# Patient Record
Sex: FEMALE | Race: WHITE | NOT HISPANIC OR LATINO | Employment: STUDENT | URBAN - METROPOLITAN AREA
[De-identification: names, ages, dates, MRNs, and addresses within clinical notes are randomized per-mention and may not be internally consistent; named-entity substitution may affect disease eponyms.]

---

## 2024-01-15 ENCOUNTER — TRANSFERRED RECORDS (OUTPATIENT)
Dept: HEALTH INFORMATION MANAGEMENT | Facility: CLINIC | Age: 19
End: 2024-01-15

## 2024-03-29 ENCOUNTER — TRANSFERRED RECORDS (OUTPATIENT)
Dept: HEALTH INFORMATION MANAGEMENT | Facility: CLINIC | Age: 19
End: 2024-03-29

## 2024-08-12 ENCOUNTER — MEDICAL CORRESPONDENCE (OUTPATIENT)
Dept: HEALTH INFORMATION MANAGEMENT | Facility: CLINIC | Age: 19
End: 2024-08-12

## 2024-08-12 ENCOUNTER — TRANSFERRED RECORDS (OUTPATIENT)
Dept: HEALTH INFORMATION MANAGEMENT | Facility: CLINIC | Age: 19
End: 2024-08-12

## 2024-08-20 ENCOUNTER — TRANSFERRED RECORDS (OUTPATIENT)
Dept: HEALTH INFORMATION MANAGEMENT | Facility: CLINIC | Age: 19
End: 2024-08-20
Payer: COMMERCIAL

## 2024-09-11 ENCOUNTER — OFFICE VISIT (OUTPATIENT)
Dept: CARDIOLOGY | Facility: CLINIC | Age: 19
End: 2024-09-11
Payer: COMMERCIAL

## 2024-09-11 VITALS
BODY MASS INDEX: 21.33 KG/M2 | HEIGHT: 65 IN | DIASTOLIC BLOOD PRESSURE: 50 MMHG | SYSTOLIC BLOOD PRESSURE: 90 MMHG | HEART RATE: 62 BPM | WEIGHT: 128 LBS | RESPIRATION RATE: 17 BRPM

## 2024-09-11 DIAGNOSIS — I95.0 IDIOPATHIC HYPOTENSION: Primary | ICD-10-CM

## 2024-09-11 PROCEDURE — 99203 OFFICE O/P NEW LOW 30 MIN: CPT | Performed by: STUDENT IN AN ORGANIZED HEALTH CARE EDUCATION/TRAINING PROGRAM

## 2024-09-11 PROCEDURE — G2211 COMPLEX E/M VISIT ADD ON: HCPCS | Performed by: STUDENT IN AN ORGANIZED HEALTH CARE EDUCATION/TRAINING PROGRAM

## 2024-09-11 NOTE — PATIENT INSTRUCTIONS
It was a pleasure meeting you today    In summary:    We will get an echocardiogram to assess the heart function and structure.  I would recommend we increase the salt intake further which will help boost the blood volume and help with the blood pressure.    Please call my nurse Claus Lilly at [phone] if you have any questions or issues.    We will schedule a follow up visit in  4 months      Black Moulton DO [facility]  Non-invasive Cardiologist  [facility]

## 2024-09-11 NOTE — PROGRESS NOTES
"  [facility]   [address]  [place] [address]   [email or web address]   OFFICE: [phone]     CARDIOLOGY CLINIC NOTE     Thank you, Keyona Montano, for asking the [facility] team to see Ms. Lizeth Segovia to evaluate Consult          History of Present Illness   Ms. Lizeth Segovia is a 18 year old female with a significant past history of low blood pressure who presents for evaluation of her blood pressure.  The patient notes her BP has always been on the low side but since about 2021 or 2022 BP has been very low.  She runs track and field and notes lightheadedness while working out (more weight lifting rather than running) and sometimes after working out.  She recently started adding salt to her water and drinking electrolyte solutions which has helped.  She denies any syncope.  She does note she got COVID about 4 times and wonders if she could have a manifestation of long COVID.  She also notes following a strict diet of no dairy, sugar, gluten.  She does not restrict salt but thinks she may not get enough dietary salt.  She does note her sister has POTS diagnosed at [place].  It sounds like she has had issues since she was 10 and has more classic POTS symptoms.           Medications  Allergies   No current outpatient medications on file.      Allergies   Allergen Reactions    Cats Other (See Comments)     Sneezing    Lactose GI Disturbance    Other Environmental Allergy Other (See Comments)     Sneezing        Physical Examination Review of Systems   Vitals: BP 90/50 (BP Location: Left arm, Patient Position: Sitting, Cuff Size: Adult Regular)   Pulse 62   Resp 17   Ht 1.64 m (5' 4.57\")   Wt 58.1 kg (128 lb)   BMI 21.59 kg/m    BMI= Body mass index is 21.59 kg/m .  Wt Readings from Last 3 Encounters:   09/11/24 58.1 kg (128 lb)       General: pleasant female. No acute distress.   Neck: No JVD  Lungs: clear to auscultation  COR:  " "regular rate and rhythm, No murmurs, rubs, or gallops  Extrem: No edema        Please refer above for cardiac ROS details.       Past History     Family History: No family history on file.     Social History:   Social History     Socioeconomic History    Marital status: Single     Spouse name: Not on file    Number of children: Not on file    Years of education: Not on file    Highest education level: Not on file   Occupational History    Not on file   Tobacco Use    Smoking status: Never    Smokeless tobacco: Never   Substance and Sexual Activity    Alcohol use: Not on file    Drug use: Never    Sexual activity: Not on file   Other Topics Concern    Not on file   Social History Narrative    Not on file     Social Determinants of Health     Financial Resource Strain: Not on file   Food Insecurity: Not on file   Transportation Needs: Not on file   Physical Activity: Not on file   Stress: Not on file   Social Connections: Not on file   Interpersonal Safety: Not on file   Housing Stability: Not on file            Lab Results    Chemistry/lipid CBC Cardiac Enzymes/BNP/TSH/INR   No results found for: \"CHOL\", \"HDL\", \"TRIG\", \"CHOLHDL\", \"CREATININE\", \"BUN\", \"NA\", \"CO2\" No results found for: \"WBC\", \"HGB\", \"HCT\", \"MCV\", \"PLT\" No results found for: \"CKTOTAL\", \"CKMB\", \"TROPONINI\", \"BNP\", \"TSH\", \"INR\"       Cardiac Problems and Cardiac Diagnostics     Most Recent Cardiac testing:  Ziopatch 7 day 8/2024 results reviewed  Avg HR 72 (range )  No significant ectopy or arrhythmia    24 hr BP monitor 8/2024 results reviewed  Average BP 93/55  Minimal nocturnal change         Assessment/Recommendations   Assessment:    Ms. Lizeth Segovia is a 18 year old female with a significant past history of low blood pressure who presents for evaluation of her blood pressure.     Idiopathic hypotension   - Lightheadedness during exercise correlates with BP of 77/47 on 24 hr BP monitor   - Encouraged to aggressively increase electrolyte " and salt intake, especially before and during exercise.   - Etiology is unclear but dietary restrictions and low salt intake may be contributing   - Another possibility is autonomic dysfunction related to COVID.     - Will get a TTE to rule out structural/functional problems    RTC 4 months    The longitudinal plan of care for the diagnosis(es)/condition(s) as documented were addressed during this visit. Due to the added complexity in care, I will continue to support Lizeth in the subsequent management and with ongoing continuity of care.         Black Moulton DO [facility]  Non-invasive Cardiologist  [facility]

## 2024-09-11 NOTE — LETTER
"9/11/2024    ROCCO SOARES MD  [facility] [address] [place] [id number]    RE: Lizeth Segovia       Dear Colleague,     I had the pleasure of seeing Lizeth Segovia in the [facility].    [facility]   [address]  [place] [address]   [email or web address]   OFFICE: [phone]     CARDIOLOGY CLINIC NOTE     Thank you, Rocco Montano, for asking the [facility] team to see Ms. Lizeth Segovia to evaluate Consult          History of Present Illness   Ms. Lizeth Segovia is a 18 year old female with a significant past history of low blood pressure who presents for evaluation of her blood pressure.  The patient notes her BP has always been on the low side but since about 2021 or 2022 BP has been very low.  She runs track and field and notes lightheadedness while working out (more weight lifting rather than running) and sometimes after working out.  She recently started adding salt to her water and drinking electrolyte solutions which has helped.  She denies any syncope.  She does note she got COVID about 4 times and wonders if she could have a manifestation of long COVID.  She also notes following a strict diet of no dairy, sugar, gluten.  She does not restrict salt but thinks she may not get enough dietary salt.  She does note her sister has POTS diagnosed at [place].  It sounds like she has had issues since she was 10 and has more classic POTS symptoms.           Medications  Allergies   No current outpatient medications on file.      Allergies   Allergen Reactions     Cats Other (See Comments)     Sneezing     Lactose GI Disturbance     Other Environmental Allergy Other (See Comments)     Sneezing        Physical Examination Review of Systems   Vitals: BP 90/50 (BP Location: Left arm, Patient Position: Sitting, Cuff Size: Adult Regular)   Pulse 62   Resp 17   Ht 1.64 m (5' 4.57\")   Wt 58.1 kg (128 lb)  " " BMI 21.59 kg/m    BMI= Body mass index is 21.59 kg/m .  Wt Readings from Last 3 Encounters:   09/11/24 58.1 kg (128 lb)       General: pleasant female. No acute distress.   Neck: No JVD  Lungs: clear to auscultation  COR:  regular rate and rhythm, No murmurs, rubs, or gallops  Extrem: No edema        Please refer above for cardiac ROS details.       Past History     Family History: No family history on file.     Social History:   Social History     Socioeconomic History     Marital status: Single     Spouse name: Not on file     Number of children: Not on file     Years of education: Not on file     Highest education level: Not on file   Occupational History     Not on file   Tobacco Use     Smoking status: Never     Smokeless tobacco: Never   Substance and Sexual Activity     Alcohol use: Not on file     Drug use: Never     Sexual activity: Not on file   Other Topics Concern     Not on file   Social History Narrative     Not on file     Social Determinants of Health     Financial Resource Strain: Not on file   Food Insecurity: Not on file   Transportation Needs: Not on file   Physical Activity: Not on file   Stress: Not on file   Social Connections: Not on file   Interpersonal Safety: Not on file   Housing Stability: Not on file            Lab Results    Chemistry/lipid CBC Cardiac Enzymes/BNP/TSH/INR   No results found for: \"CHOL\", \"HDL\", \"TRIG\", \"CHOLHDL\", \"CREATININE\", \"BUN\", \"NA\", \"CO2\" No results found for: \"WBC\", \"HGB\", \"HCT\", \"MCV\", \"PLT\" No results found for: \"CKTOTAL\", \"CKMB\", \"TROPONINI\", \"BNP\", \"TSH\", \"INR\"       Cardiac Problems and Cardiac Diagnostics     Most Recent Cardiac testing:  Ziopatch 7 day 8/2024 results reviewed  Avg HR 72 (range )  No significant ectopy or arrhythmia    24 hr BP monitor 8/2024 results reviewed  Average BP 93/55  Minimal nocturnal change         Assessment/Recommendations   Assessment:    Ms. Lizeth Segovia is a 18 year old female with a significant past history " of low blood pressure who presents for evaluation of her blood pressure.     Idiopathic hypotension   - Lightheadedness during exercise correlates with BP of 77/47 on 24 hr BP monitor   - Encouraged to aggressively increase electrolyte and salt intake, especially before and during exercise.   - Etiology is unclear but dietary restrictions and low salt intake may be contributing   - Another possibility is autonomic dysfunction related to COVID.     - Will get a TTE to rule out structural/functional problems    RTC 4 months    The longitudinal plan of care for the diagnosis(es)/condition(s) as documented were addressed during this visit. Due to the added complexity in care, I will continue to support Lizeth in the subsequent management and with ongoing continuity of care.         Black Moulton DO [facility]  Non-invasive Cardiologist  [facility]       Thank you for allowing me to participate in the care of your patient.      Sincerely,     Black Moulton DO     [facility]  cc:   Keyona Bowen MD  [facility]  [address] [place], [place] [id number]

## 2024-10-04 ENCOUNTER — ANCILLARY PROCEDURE (OUTPATIENT)
Dept: CARDIOLOGY | Facility: CLINIC | Age: 19
End: 2024-10-04
Attending: STUDENT IN AN ORGANIZED HEALTH CARE EDUCATION/TRAINING PROGRAM
Payer: COMMERCIAL

## 2024-10-04 DIAGNOSIS — I95.0 IDIOPATHIC HYPOTENSION: ICD-10-CM

## 2024-10-04 PROCEDURE — 93306 TTE W/DOPPLER COMPLETE: CPT | Performed by: INTERNAL MEDICINE

## 2024-11-16 ENCOUNTER — HEALTH MAINTENANCE LETTER (OUTPATIENT)
Age: 19
End: 2024-11-16

## 2025-02-18 ENCOUNTER — OFFICE VISIT (OUTPATIENT)
Dept: FAMILY MEDICINE | Facility: CLINIC | Age: 20
End: 2025-02-18
Payer: COMMERCIAL

## 2025-02-18 ENCOUNTER — ANCILLARY PROCEDURE (OUTPATIENT)
Dept: GENERAL RADIOLOGY | Facility: CLINIC | Age: 20
End: 2025-02-18
Payer: COMMERCIAL

## 2025-02-18 VITALS
HEART RATE: 75 BPM | WEIGHT: 130 LBS | OXYGEN SATURATION: 99 % | DIASTOLIC BLOOD PRESSURE: 50 MMHG | SYSTOLIC BLOOD PRESSURE: 102 MMHG | TEMPERATURE: 97.4 F | BODY MASS INDEX: 21.66 KG/M2 | HEIGHT: 65 IN | RESPIRATION RATE: 16 BRPM

## 2025-02-18 DIAGNOSIS — R07.81 RIB PAIN: Primary | ICD-10-CM

## 2025-02-18 DIAGNOSIS — R07.81 RIB PAIN: ICD-10-CM

## 2025-02-18 DIAGNOSIS — Z83.1: ICD-10-CM

## 2025-02-18 PROCEDURE — 36415 COLL VENOUS BLD VENIPUNCTURE: CPT

## 2025-02-18 PROCEDURE — 99213 OFFICE O/P EST LOW 20 MIN: CPT

## 2025-02-18 PROCEDURE — 86612 BLASTOMYCES ANTIBODY: CPT | Mod: 90

## 2025-02-18 PROCEDURE — 86622 BRUCELLA ANTIBODY: CPT | Mod: 90

## 2025-02-18 PROCEDURE — 71046 X-RAY EXAM CHEST 2 VIEWS: CPT | Mod: TC | Performed by: RADIOLOGY

## 2025-02-18 PROCEDURE — 99000 SPECIMEN HANDLING OFFICE-LAB: CPT

## 2025-02-18 NOTE — PROGRESS NOTES
Assessment & Plan     Rib pain  Significant recent illness.  Pain is elicited with inspiration and has been going on for a few weeks.  Has not previously had the symptoms, no cough. Chest xray to rule out pulmonary cause, pleurisy, pneumonia, mom recently tested positive for blastomycosis.  Lung sounds clear on exam.  Suspect costochondritis, discussed prednisone and patient declines but may notify clinic if she changes her mind.  Given printed information on costochondritis.  - XR Chest 2 Views; Future    Family history of brucellosis  Mother has recently been diagnosed with positive Brucella and Blastomyces infection.  They do have a family history of raw milk consumption in approximately 2018.  She is working with [facility].  Her symptoms included fatigue, some chest pain and shortness of breath, GI symptoms which daughter is also experiencing.  Will test today and refer to ID at [facility] if positive.   - Brucella species antibody; Future  - Brucella species antibody    Family history of blastomycosis  See above.  Patient denies cough, lung sounds clear on exam  - Blastomyces antibody ID; Future  - Blastomyces antibody ID              If not gettign resolutin of rib pain ricardo call for prednisone.   Denise Stanley is a 19 year old, presenting for the following health issues:  Breathing Problem (Right side chest pain while breathing x few weeks. Has hx of this. No cough. Also having GI issues. )      2/18/2025    11:16 AM   Additional Questions   Roomed by nesha lerma   Accompanied by self     History of Present Illness       Reason for visit:  Chest pain while breathing  Symptom onset:  3-4 weeks ago  Symptoms include:  Chest pain right side while breathing  Symptom intensity:  Moderate  Symptom progression:  Worsening  Had these symptoms before:  Yes  Has tried/received treatment for these symptoms:  No  What makes it worse:  Breathing deeply and jumping or running  What makes it better:  Breathing  "shallowly and staying still   She is taking medications regularly.                     Objective    /50 (BP Location: Right arm, Patient Position: Sitting)   Pulse 75   Temp 97.4  F (36.3  C) (Tympanic)   Resp 16   Ht 1.638 m (5' 4.5\")   Wt 59 kg (130 lb)   LMP 02/11/2025 (Exact Date)   SpO2 99%   BMI 21.97 kg/m    Body mass index is 21.97 kg/m .  Physical Exam   GENERAL: alert and no distress  EYES: Eyes grossly normal to inspection, PERRL and conjunctivae and sclerae normal  HENT: ear canals and TM's normal, nose and mouth without ulcers or lesions  NECK: no adenopathy, no asymmetry, masses, or scars  RESP: lungs clear to auscultation - no rales, rhonchi or wheezes  CV: regular rate and rhythm, normal S1 S2, no S3 or S4, no murmur, click or rub, no peripheral edema  ABDOMEN: soft, nontender, no hepatosplenomegaly, no masses and bowel sounds normal  MS: no gross musculoskeletal defects noted, no edema  SKIN: no suspicious lesions or rashes  NEURO: Normal strength and tone, mentation intact and speech normal  PSYCH: mentation appears normal, affect normal/bright            Signed Electronically by: MARIELENA Mukherjee CNP    "

## 2025-02-18 NOTE — LETTER
February 18, 2025      Lizeth Segovia  [address] [id number]        To Whom It May Concern:    Lizeth Segovia was seen on 2/18/2025.  Please allow her to modify or decrease activity due to illness.      Sincerely,        MARIELENA Mukherjee CNP    Electronically signed

## 2025-02-22 LAB — BRUCELLA AB TITR SER AGGL: NORMAL {TITER}

## 2025-02-23 LAB — B DERMAT AB SER QL ID: NOT DETECTED

## 2025-03-08 ENCOUNTER — TRANSFERRED RECORDS (OUTPATIENT)
Dept: HEALTH INFORMATION MANAGEMENT | Facility: CLINIC | Age: 20
End: 2025-03-08
Payer: COMMERCIAL

## 2025-03-08 LAB
ALT SERPL-CCNC: 18 U/L
AST SERPL-CCNC: 30 U/L (ref 14–36)
CREATININE (EXTERNAL): 1.1 MG/DL (ref 0.7–1.4)
GLUCOSE (EXTERNAL): 85 MG/DL (ref 60–99)
POTASSIUM (EXTERNAL): 4.1 MMOL/L (ref 3.5–5.1)
TSH SERPL-ACNC: 0.53 MIU/L (ref 0.47–4.68)

## 2025-04-21 ENCOUNTER — OFFICE VISIT (OUTPATIENT)
Dept: CARDIOLOGY | Facility: CLINIC | Age: 20
End: 2025-04-21
Attending: STUDENT IN AN ORGANIZED HEALTH CARE EDUCATION/TRAINING PROGRAM
Payer: COMMERCIAL

## 2025-04-21 VITALS
WEIGHT: 128 LBS | SYSTOLIC BLOOD PRESSURE: 99 MMHG | OXYGEN SATURATION: 99 % | HEART RATE: 60 BPM | DIASTOLIC BLOOD PRESSURE: 63 MMHG | BODY MASS INDEX: 21.63 KG/M2 | RESPIRATION RATE: 16 BRPM

## 2025-04-21 DIAGNOSIS — U07.1 INFECTION DUE TO 2019 NOVEL CORONAVIRUS: ICD-10-CM

## 2025-04-21 DIAGNOSIS — I95.0 IDIOPATHIC HYPOTENSION: Primary | ICD-10-CM

## 2025-04-21 PROCEDURE — 3078F DIAST BP <80 MM HG: CPT | Performed by: STUDENT IN AN ORGANIZED HEALTH CARE EDUCATION/TRAINING PROGRAM

## 2025-04-21 PROCEDURE — 3074F SYST BP LT 130 MM HG: CPT | Performed by: STUDENT IN AN ORGANIZED HEALTH CARE EDUCATION/TRAINING PROGRAM

## 2025-04-21 PROCEDURE — 99213 OFFICE O/P EST LOW 20 MIN: CPT | Performed by: STUDENT IN AN ORGANIZED HEALTH CARE EDUCATION/TRAINING PROGRAM

## 2025-04-21 RX ORDER — MIDODRINE HYDROCHLORIDE 2.5 MG/1
2.5 TABLET ORAL EVERY 8 HOURS PRN
Qty: 30 TABLET | Refills: 1 | Status: SHIPPED | OUTPATIENT
Start: 2025-04-21

## 2025-04-21 NOTE — LETTER
4/21/2025    ROCCO SOARES MD  [facility] [address] [place] [id number]    RE: Lizeth Segovia       Dear Colleague,     I had the pleasure of seeing Lizeth Segovia in the [facility].    [facility]   [address]  [place] [address]   [email or web address]   OFFICE: [phone]     CARDIOLOGY CLINIC NOTE     Thank you, Rocco Montano, for asking the [facility] team to see Ms. Lizeth Segovia to evaluate Follow Up          History of Present Illness   Ms. Lizeth Segovia is a 19 year old female with a significant past history of low blood pressure, prior COVID infection, who presents for follow up.  The patient notes she has done well overall.  She still has issues with lightheadedness on position changes and one time during an indoor track meet when it was very hot.  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  She still has not passed out but gets spotty vision, muffled hearing, and lightheadedness.             Medications  Allergies   Current Outpatient Medications   Medication Sig Dispense Refill     Ascorbic Acid (VITAMIN C) 500 MG CAPS Take by mouth daily.       calcium carbonate (OS-ALVINA) 500 MG tablet Take 1 tablet by mouth 2 times daily.       midodrine (PROAMATINE) 2.5 MG tablet Take 1 tablet (2.5 mg) by mouth every 8 hours as needed (As needed for low BP.). 30 tablet 1     vitamin D3 (CHOLECALCIFEROL) 250 mcg (07529 units) capsule Take 1 capsule by mouth. A few times a week       VITAMIN K PO Take by mouth. 3 times weekly       zinc gluconate 50 MG tablet Take 50 mg by mouth daily.        Allergies   Allergen Reactions     Cats Other (See Comments)     Sneezing     Lactose GI Disturbance     Other Environmental Allergy Other (See Comments)     Sneezing        Physical Examination Review of Systems   Vitals: BP 99/63 (BP  "Location: Right arm, Patient Position: Sitting, Cuff Size: Adult Regular)   Pulse 60   Resp 16   Wt 58.1 kg (128 lb)   SpO2 99%   BMI 21.63 kg/m    BMI= Body mass index is 21.63 kg/m .  Wt Readings from Last 3 Encounters:   04/21/25 58.1 kg (128 lb)   02/18/25 59 kg (130 lb)   01/03/25 60.7 kg (133 lb 14.4 oz)       General: pleasant female. No acute distress.   Neck: No JVD  Lungs: clear to auscultation  COR:  regular rate and rhythm, No murmurs, rubs, or gallops  Extrem: No edema        Please refer above for cardiac ROS details.       Past History     Family History: No family history on file.     Social History:   Social History     Socioeconomic History     Marital status: Single     Spouse name: Not on file     Number of children: Not on file     Years of education: Not on file     Highest education level: Not on file   Occupational History     Not on file   Tobacco Use     Smoking status: Never     Passive exposure: Never     Smokeless tobacco: Never   Vaping Use     Vaping status: Never Used   Substance and Sexual Activity     Alcohol use: Not on file     Drug use: Never     Sexual activity: Not on file   Other Topics Concern     Not on file   Social History Narrative     Not on file     Social Drivers of Health     Financial Resource Strain: Not on file   Food Insecurity: Not on file   Transportation Needs: Not on file   Physical Activity: Not on file   Stress: Not on file   Social Connections: Not on file   Interpersonal Safety: Not on file   Housing Stability: Not on file            Lab Results    Chemistry/lipid CBC Cardiac Enzymes/BNP/TSH/INR   No results found for: \"CHOL\", \"HDL\", \"TRIG\", \"CHOLHDL\", \"CREATININE\", \"BUN\", \"NA\", \"CO2\" No results found for: \"WBC\", \"HGB\", \"HCT\", \"MCV\", \"PLT\" No results found for: \"CKTOTAL\", \"CKMB\", \"TROPONINI\", \"BNP\", \"TSH\", \"INR\"       Cardiac Problems and Cardiac Diagnostics     Most Recent Cardiac testing:  Ziopatch 7 day 8/2024 results reviewed  Avg HR 72 (range " )  No significant ectopy or arrhythmia     24 hr BP monitor 8/2024 results reviewed  Average BP 93/55  Minimal nocturnal change    ECHO 10/4/2024  Interpretation Summary     1. Normal left ventricular size and systolic performance with a visually  estimated ejection fraction of 60-65%.  2. No significant valvular heart disease is identified on this study.  3. Normal right ventricular size and systolic performance.           Assessment/Recommendations   Assessment:    Ms. Lizeth Segovia is a 19 year old female with a significant past history of low blood pressure, prior COVID infection, who presents for follow up.      Idiopathic hypotension   - Lightheadedness during exercise correlates with BP of 77/47 on 24 hr BP monitor   - Continue to aggressively increase electrolyte and salt intake, especially before and during exercise.   - Etiology is unclear but dietary restrictions and low salt intake may be contributing.  Another possibility is autonomic dysfunction related to COVID.     - TTE wnl   - Will try a PRN midodrine dose at 2.5mg    - Can try compression stockings especially while at work and on her feet   - Refer to Dr. Escobedo at the .         Black Moulton DO [facility]  Non-invasive Cardiologist  [facility]         Thank you for allowing me to participate in the care of your patient.      Sincerely,     Black Moulton DO     [facility]  cc:   Black Moulton DO  [address]  [place] [address]

## 2025-04-21 NOTE — PATIENT INSTRUCTIONS
It was a pleasure meeting you today    In summary:    We will try the as needed Midodrine 2.5mg.  Continue to increase your fluid and electrolyte intake.  You can try compression stockings on work days when you are on your feet.   a BP cuff to monitor your BP especially on days you take midodrine.  We will refer you to see Dr. Escobedo at the [place].      Please call my nurse Claus Lilly at [phone] if you have any questions or issues.    Black Moulton, DO [facility]  Non-invasive Cardiologist  [facility]

## 2025-04-21 NOTE — PROGRESS NOTES
[facility] HEART CARE   [address]  [place] [address]   [email or web address]   OFFICE: [phone]     CARDIOLOGY CLINIC NOTE     Thank you, Keyona Montano, for asking the [facility] Heart Care team to see Ms. Lizeth Segovia to evaluate Follow Up          History of Present Illness   Ms. Lizeth Segovia is a 19 year old female with a significant past history of low blood pressure, prior COVID infection, who presents for follow up.  The patient notes she has done well overall.  She still has issues with lightheadedness on position changes and one time during an indoor track meet when it was very hot.  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  She still has not passed out but gets spotty vision, muffled hearing, and lightheadedness.             Medications  Allergies   Current Outpatient Medications   Medication Sig Dispense Refill    Ascorbic Acid (VITAMIN C) 500 MG CAPS Take by mouth daily.      calcium carbonate (OS-ALVINA) 500 MG tablet Take 1 tablet by mouth 2 times daily.      midodrine (PROAMATINE) 2.5 MG tablet Take 1 tablet (2.5 mg) by mouth every 8 hours as needed (As needed for low BP.). 30 tablet 1    vitamin D3 (CHOLECALCIFEROL) 250 mcg (44782 units) capsule Take 1 capsule by mouth. A few times a week      VITAMIN K PO Take by mouth. 3 times weekly      zinc gluconate 50 MG tablet Take 50 mg by mouth daily.        Allergies   Allergen Reactions    Cats Other (See Comments)     Sneezing    Lactose GI Disturbance    Other Environmental Allergy Other (See Comments)     Sneezing        Physical Examination Review of Systems   Vitals: BP 99/63 (BP Location: Right arm, Patient Position: Sitting, Cuff Size: Adult Regular)   Pulse 60   Resp 16   Wt 58.1 kg (128 lb)   SpO2 99%   BMI 21.63 kg/m    BMI= Body mass index is 21.63 kg/m .  Wt Readings from Last 3 Encounters:   04/21/25  "58.1 kg (128 lb)   02/18/25 59 kg (130 lb)   01/03/25 60.7 kg (133 lb 14.4 oz)       General: pleasant female. No acute distress.   Neck: No JVD  Lungs: clear to auscultation  COR:  regular rate and rhythm, No murmurs, rubs, or gallops  Extrem: No edema        Please refer above for cardiac ROS details.       Past History     Family History: No family history on file.     Social History:   Social History     Socioeconomic History    Marital status: Single     Spouse name: Not on file    Number of children: Not on file    Years of education: Not on file    Highest education level: Not on file   Occupational History    Not on file   Tobacco Use    Smoking status: Never     Passive exposure: Never    Smokeless tobacco: Never   Vaping Use    Vaping status: Never Used   Substance and Sexual Activity    Alcohol use: Not on file    Drug use: Never    Sexual activity: Not on file   Other Topics Concern    Not on file   Social History Narrative    Not on file     Social Drivers of Health     Financial Resource Strain: Not on file   Food Insecurity: Not on file   Transportation Needs: Not on file   Physical Activity: Not on file   Stress: Not on file   Social Connections: Not on file   Interpersonal Safety: Not on file   Housing Stability: Not on file            Lab Results    Chemistry/lipid CBC Cardiac Enzymes/BNP/TSH/INR   No results found for: \"CHOL\", \"HDL\", \"TRIG\", \"CHOLHDL\", \"CREATININE\", \"BUN\", \"NA\", \"CO2\" No results found for: \"WBC\", \"HGB\", \"HCT\", \"MCV\", \"PLT\" No results found for: \"CKTOTAL\", \"CKMB\", \"TROPONINI\", \"BNP\", \"TSH\", \"INR\"       Cardiac Problems and Cardiac Diagnostics     Most Recent Cardiac testing:  [facility] 7 day 8/2024 results reviewed  Avg HR 72 (range )  No significant ectopy or arrhythmia     24 hr BP monitor 8/2024 results reviewed  Average BP 93/55  Minimal nocturnal change    ECHO 10/4/2024  Interpretation Summary     1. Normal left ventricular size and systolic performance with a " visually  estimated ejection fraction of 60-65%.  2. No significant valvular heart disease is identified on this study.  3. Normal right ventricular size and systolic performance.           Assessment/Recommendations   Assessment:    Ms. Lizeth Segovia is a 19 year old female with a significant past history of low blood pressure, prior COVID infection, who presents for follow up.      Idiopathic hypotension   - Lightheadedness during exercise correlates with BP of 77/47 on 24 hr BP monitor   - Continue to aggressively increase electrolyte and salt intake, especially before and during exercise.   - Etiology is unclear but dietary restrictions and low salt intake may be contributing.  Another possibility is autonomic dysfunction related to COVID.     - TTE wnl   - Will try a PRN midodrine dose at 2.5mg    - Can try compression stockings especially while at work and on her feet   - Refer to Dr. Escobedo at the [facility]  Non-invasive Cardiologist  [facility]

## 2025-07-12 ENCOUNTER — APPOINTMENT (OUTPATIENT)
Dept: CT IMAGING | Facility: HOSPITAL | Age: 20
End: 2025-07-12
Attending: STUDENT IN AN ORGANIZED HEALTH CARE EDUCATION/TRAINING PROGRAM
Payer: COMMERCIAL

## 2025-07-12 ENCOUNTER — HOSPITAL ENCOUNTER (EMERGENCY)
Facility: HOSPITAL | Age: 20
Discharge: HOME | End: 2025-07-12
Attending: EMERGENCY MEDICINE | Admitting: EMERGENCY MEDICINE
Payer: COMMERCIAL

## 2025-07-12 VITALS
RESPIRATION RATE: 16 BRPM | WEIGHT: 123.3 LBS | BODY MASS INDEX: 20.54 KG/M2 | HEART RATE: 78 BPM | OXYGEN SATURATION: 98 % | TEMPERATURE: 98 F | HEIGHT: 65 IN | DIASTOLIC BLOOD PRESSURE: 56 MMHG | SYSTOLIC BLOOD PRESSURE: 111 MMHG

## 2025-07-12 DIAGNOSIS — J18.9 COMMUNITY ACQUIRED PNEUMONIA OF RIGHT MIDDLE LOBE OF LUNG: ICD-10-CM

## 2025-07-12 LAB
ALBUMIN SERPL BCG-MCNC: 4.2 G/DL (ref 3.5–5.2)
ALBUMIN UR-MCNC: NEGATIVE MG/DL
ALP SERPL-CCNC: 26 U/L (ref 40–150)
ALT SERPL W P-5'-P-CCNC: 16 U/L (ref 0–50)
ANION GAP SERPL CALCULATED.3IONS-SCNC: 9 MMOL/L (ref 7–15)
APPEARANCE UR: CLEAR
AST SERPL W P-5'-P-CCNC: 20 U/L (ref 0–45)
BACTERIA #/AREA URNS HPF: ABNORMAL /HPF
BASOPHILS # BLD AUTO: 0 10E3/UL (ref 0–0.2)
BASOPHILS NFR BLD AUTO: 1 %
BILIRUB SERPL-MCNC: 0.3 MG/DL
BILIRUB UR QL STRIP: NEGATIVE
BUN SERPL-MCNC: 6.1 MG/DL (ref 6–20)
CALCIUM SERPL-MCNC: 9 MG/DL (ref 8.8–10.4)
CHLORIDE SERPL-SCNC: 100 MMOL/L (ref 98–107)
COLOR UR AUTO: ABNORMAL
CREAT SERPL-MCNC: 0.99 MG/DL (ref 0.51–0.95)
EGFRCR SERPLBLD CKD-EPI 2021: 83 ML/MIN/1.73M2
EOSINOPHIL # BLD AUTO: 0 10E3/UL (ref 0–0.7)
EOSINOPHIL NFR BLD AUTO: 0 %
ERYTHROCYTE [DISTWIDTH] IN BLOOD BY AUTOMATED COUNT: 12 % (ref 10–15)
GLUCOSE SERPL-MCNC: 102 MG/DL (ref 70–99)
GLUCOSE UR STRIP-MCNC: NEGATIVE MG/DL
HCG UR QL: NEGATIVE
HCO3 SERPL-SCNC: 24 MMOL/L (ref 22–29)
HCT VFR BLD AUTO: 40.3 % (ref 35–47)
HGB BLD-MCNC: 13.5 G/DL (ref 11.7–15.7)
HGB UR QL STRIP: NEGATIVE
IMM GRANULOCYTES # BLD: 0 10E3/UL
IMM GRANULOCYTES NFR BLD: 0 %
KETONES UR STRIP-MCNC: NEGATIVE MG/DL
LEUKOCYTE ESTERASE UR QL STRIP: NEGATIVE
LIPASE SERPL-CCNC: 22 U/L (ref 13–60)
LYMPHOCYTES # BLD AUTO: 1.6 10E3/UL (ref 0.8–5.3)
LYMPHOCYTES NFR BLD AUTO: 30 %
MCH RBC QN AUTO: 30.7 PG (ref 26.5–33)
MCHC RBC AUTO-ENTMCNC: 33.5 G/DL (ref 31.5–36.5)
MCV RBC AUTO: 92 FL (ref 78–100)
MONOCYTES # BLD AUTO: 0.9 10E3/UL (ref 0–1.3)
MONOCYTES NFR BLD AUTO: 17 %
MUCOUS THREADS #/AREA URNS LPF: PRESENT /LPF
NEUTROPHILS # BLD AUTO: 2.7 10E3/UL (ref 1.6–8.3)
NEUTROPHILS NFR BLD AUTO: 51 %
NITRATE UR QL: NEGATIVE
NRBC # BLD AUTO: 0 10E3/UL
NRBC BLD AUTO-RTO: 0 /100
PH UR STRIP: 6 [PH] (ref 5–7)
PLATELET # BLD AUTO: 202 10E3/UL (ref 150–450)
POTASSIUM SERPL-SCNC: 3.7 MMOL/L (ref 3.4–5.3)
PROT SERPL-MCNC: 7.6 G/DL (ref 6.4–8.3)
RBC # BLD AUTO: 4.4 10E6/UL (ref 3.8–5.2)
RBC URINE: 1 /HPF
SODIUM SERPL-SCNC: 133 MMOL/L (ref 135–145)
SP GR UR STRIP: 1.01 (ref 1–1.03)
SQUAMOUS EPITHELIAL: <1 /HPF
UROBILINOGEN UR STRIP-MCNC: NORMAL MG/DL
WBC # BLD AUTO: 5.4 10E3/UL (ref 4–11)
WBC URINE: 1 /HPF

## 2025-07-12 PROCEDURE — 85004 AUTOMATED DIFF WBC COUNT: CPT | Performed by: STUDENT IN AN ORGANIZED HEALTH CARE EDUCATION/TRAINING PROGRAM

## 2025-07-12 PROCEDURE — 81001 URINALYSIS AUTO W/SCOPE: CPT | Performed by: STUDENT IN AN ORGANIZED HEALTH CARE EDUCATION/TRAINING PROGRAM

## 2025-07-12 PROCEDURE — 36415 COLL VENOUS BLD VENIPUNCTURE: CPT | Performed by: STUDENT IN AN ORGANIZED HEALTH CARE EDUCATION/TRAINING PROGRAM

## 2025-07-12 PROCEDURE — 81025 URINE PREGNANCY TEST: CPT | Performed by: STUDENT IN AN ORGANIZED HEALTH CARE EDUCATION/TRAINING PROGRAM

## 2025-07-12 PROCEDURE — 250N000011 HC RX IP 250 OP 636: Performed by: STUDENT IN AN ORGANIZED HEALTH CARE EDUCATION/TRAINING PROGRAM

## 2025-07-12 PROCEDURE — 74177 CT ABD & PELVIS W/CONTRAST: CPT

## 2025-07-12 PROCEDURE — 99285 EMERGENCY DEPT VISIT HI MDM: CPT | Mod: 25

## 2025-07-12 PROCEDURE — 83690 ASSAY OF LIPASE: CPT | Performed by: STUDENT IN AN ORGANIZED HEALTH CARE EDUCATION/TRAINING PROGRAM

## 2025-07-12 PROCEDURE — 82040 ASSAY OF SERUM ALBUMIN: CPT | Performed by: STUDENT IN AN ORGANIZED HEALTH CARE EDUCATION/TRAINING PROGRAM

## 2025-07-12 RX ORDER — IOPAMIDOL 755 MG/ML
60 INJECTION, SOLUTION INTRAVASCULAR ONCE
Status: COMPLETED | OUTPATIENT
Start: 2025-07-12 | End: 2025-07-12

## 2025-07-12 RX ADMIN — IOPAMIDOL 60 ML: 755 INJECTION, SOLUTION INTRAVENOUS at 03:38

## 2025-07-12 ASSESSMENT — COLUMBIA-SUICIDE SEVERITY RATING SCALE - C-SSRS
6. HAVE YOU EVER DONE ANYTHING, STARTED TO DO ANYTHING, OR PREPARED TO DO ANYTHING TO END YOUR LIFE?: NO
2. HAVE YOU ACTUALLY HAD ANY THOUGHTS OF KILLING YOURSELF IN THE PAST MONTH?: NO
1. IN THE PAST MONTH, HAVE YOU WISHED YOU WERE DEAD OR WISHED YOU COULD GO TO SLEEP AND NOT WAKE UP?: NO

## 2025-07-12 NOTE — DISCHARGE INSTRUCTIONS
ER your CT scan did not show any acute findings in the abdomen but did show a small area on the right middle lobe that could be pneumonia.  If fever and cough persist today recommend starting antibiotics as prescribed.  Urinalysis in the urgent care 2 days ago as well as urinalysis tonight showed no signs of infection and at this time CT scan also did not show any inflammation of the bladder.

## 2025-07-12 NOTE — ED TRIAGE NOTES
Abd pain started Wednesday, possible kidney infection, went to ER Wednesday, went to primary and had US, said most likely infection     Triage Assessment (Adult)       Row Name 07/12/25 0147          Triage Assessment    Airway WDL WDL        Respiratory WDL    Respiratory WDL WDL        Skin Circulation/Temperature WDL    Skin Circulation/Temperature WDL WDL        Cardiac WDL    Cardiac WDL WDL        Peripheral/Neurovascular WDL    Peripheral Neurovascular WDL WDL        Cognitive/Neuro/Behavioral WDL    Cognitive/Neuro/Behavioral WDL WDL

## 2025-07-12 NOTE — ED PROVIDER NOTES
NAME: Lizeth Segovia  AGE: 19 year old female  YOB: 2005  MRN: [id number]  EVALUATION DATE & TIME: No admission date for patient encounter.    PCP: Keyona Bowen    ED PROVIDER: Pablo Herman M.D.      Chief Complaint   Patient presents with    Abdominal Pain     Abd pain started Wednesday, possible kidney infection, went to ER Wednesday, went to primary and had US, said most likely infection         FINAL IMPRESSION:  1. Community acquired pneumonia of right middle lobe of lung        MEDICAL DECISION MAKING:    3:29 AM I met with the patient, obtained history, performed an initial exam, and discussed options and plan for diagnostics and treatment here in the ED.   Patient was clinically assessed and consented to treatment. After assessment, medical decision making and workup were discussed with the patient. The patient was agreeable to plan for testing, workup, and treatment.  Pertinent Labs & Imaging studies reviewed. (See chart for details)       Medical Decision Making  I obtained history from Family Member/Significant Other  I reviewed the EMR: Outpatient Record: Outpatient report from urgent care as well as from primary care doctor through the mother's phone  Discharge. I prescribed additional prescription strength medication(s) as charted. See documentation for any additional details.    MIPS (CTPE, Dental pain, Oh, Sinusitis, Asthma/COPD, Head Trauma): Not Applicable    SEPSIS: None        Lizeth Segovia is a 19 year old female who presents with abdominal pain, chills, aches.   Differential diagnosis includes but not limited to urinary tract infection, viral enteritis, appendicitis, epiploic appendagitis.  Patient is a 19-year-old female who describes some intermittent abdominal pain with possible urinary tract infection with seen her primary doctor.  She also had ultrasound that was negative for any ovarian or pelvic findings.  Patient's urinalysis from care was negative  for signs of infection.  Patient did follow-up with primary doctor and had ultrasound at that time which was unremarkable for any pelvic or  abnormality.  They did do a urinalysis done which had mucus and some slight leukocytes and concern for infection but no nitrates.  Labs done here from triage showed no leukocytosis, stable hemoglobin, unremarkable urine with clear findings so far here and only slight 1 week is likely contamination.  Metabolic panel was otherwise unremarkable and lipase was normal.  Patient sent for CT which showed a right middle lobe opacity that could be atelectasis but also could be infection.  I discussed with patient's parents and her and she does note that she has been coughing and aching somewhat with intermittent fevers over the last several days.  Following discussion with her they would prefer to consider possible treatment for the pneumonia though she is not coughing while I examine her.  After discussion with mother who does not wish to put antibiotics on patient patient and her mother and I discussed possibility of prescription for the pneumonia but first seeing how she does over the next 12 hours and if return of fever or worsening cough she will start the antibiotic.  She does have a prescription for Macrobid from her primary care but given that the urinalysis from urgent care and from here are both negative and the urinalysis from her primary care could be contaminated and concentrated I feel patient should hold off starting that medication.  Patient comfortable with this plan will be discharged home to follow-up close with her primary doctor or start prescriptions.    0 minutes of critical care time    MEDICATIONS GIVEN IN THE EMERGENCY:  Medications   iopamidol (ISOVUE-370) solution 60 mL (60 mLs Intravenous $Given 7/12/25 0338)       NEW PRESCRIPTIONS STARTED AT TODAY'S ER VISIT:  Discharge Medication List as of 7/12/2025  4:50 AM        START taking these medications     Details   amoxicillin-clavulanate (AUGMENTIN) 875-125 MG tablet Take 1 tablet by mouth 2 times daily for 7 days., Disp-14 tablet, R-0, Local Print                =================================================================    HPI    Patient information was obtained from: patient    Use of : N/A      Lizeth Segovia is a 19 year old female with a past medical history of anxiety and food sensitivity, who presents with abdominal pain.    Patient reports periumbilical abdominal pain, nausea, and malaise since 7/9. She didn't think much of it at first, but awoke at 10:00PM that day from the pain. It abruptly stopped at midnight on 7/10, which concerned her mother enough to bring her into an outside ER. Her workup there was normal. She was seen at a clinic today and told she may have a UTI or bladder infection that has spread to her kidneys. She was prescribed antibiotics but did not start them. Her pain is now newly radiating into her lower abdomen and back.     Reviewed 7/10/25 visit to [facility] for abdominal pain. UA with no signs of infection, abdominal pain resolved. Labs and CT deferred.    REVIEW OF SYSTEMS   See HPI    PAST MEDICAL HISTORY:  No past medical history on file.    PAST SURGICAL HISTORY:  No past surgical history on file.    CURRENT MEDICATIONS:    No current facility-administered medications for this encounter.    Current Outpatient Medications:     amoxicillin-clavulanate (AUGMENTIN) 875-125 MG tablet, Take 1 tablet by mouth 2 times daily for 7 days., Disp: 14 tablet, Rfl: 0    Ascorbic Acid (VITAMIN C) 500 MG CAPS, Take by mouth daily., Disp: , Rfl:     calcium carbonate (OS-ALVINA) 500 MG tablet, Take 1 tablet by mouth 2 times daily., Disp: , Rfl:     midodrine (PROAMATINE) 2.5 MG tablet, Take 1 tablet (2.5 mg) by mouth every 8 hours as needed (As needed for low BP.)., Disp: 30 tablet, Rfl: 1    vitamin D3 (CHOLECALCIFEROL) 250 mcg (66559 units) capsule, Take 1 capsule  "by mouth. A few times a week, Disp: , Rfl:     VITAMIN K PO, Take by mouth. 3 times weekly, Disp: , Rfl:     zinc gluconate 50 MG tablet, Take 50 mg by mouth daily., Disp: , Rfl:     ALLERGIES:  Allergies   Allergen Reactions    Cats Other (See Comments)     Sneezing    Lactose GI Disturbance    Other Environmental Allergy Other (See Comments)     Sneezing       FAMILY HISTORY:  No family history on file.    SOCIAL HISTORY:   Social History     Socioeconomic History    Marital status: Single   Tobacco Use    Smoking status: Never     Passive exposure: Never    Smokeless tobacco: Never   Vaping Use    Vaping status: Never Used   Substance and Sexual Activity    Drug use: Never     Social Drivers of Health      Received from [employer]    Financial Resource Strain    Received from [employer]    Social Connections       PHYSICAL EXAM:    Vitals: /56   Pulse 78   Temp 98  F (36.7  C) (Temporal)   Resp 16   Ht 1.651 m (5' 5\")   Wt 55.9 kg (123 lb 4.8 oz)   LMP 06/28/2025 (Exact Date)   SpO2 98%   BMI 20.52 kg/m     Physical Exam  Vitals and nursing note reviewed.   Constitutional:       General: She is not in acute distress.     Appearance: She is well-developed and normal weight. She is not ill-appearing or toxic-appearing.   HENT:      Head: Normocephalic.   Eyes:      General: No scleral icterus.     Extraocular Movements: Extraocular movements intact.   Cardiovascular:      Rate and Rhythm: Normal rate and regular rhythm.      Heart sounds: Normal heart sounds.   Pulmonary:      Effort: Pulmonary effort is normal. No respiratory distress.      Breath sounds: Normal breath sounds.   Abdominal:      General: Abdomen is flat.      Palpations: Abdomen is soft.      Tenderness: There is no abdominal tenderness. There is no right CVA tenderness, left CVA tenderness, guarding or rebound.      Hernia: No hernia is present.   Skin:     General: " Skin is warm and dry.      Coloration: Skin is not jaundiced or pale.   Neurological:      Mental Status: She is alert.   Psychiatric:         Behavior: Behavior normal.           LAB:  All pertinent labs reviewed and interpreted.  Labs Ordered and Resulted from Time of ED Arrival to Time of ED Departure   COMPREHENSIVE METABOLIC PANEL - Abnormal       Result Value    Sodium 133 (*)     Potassium 3.7      Carbon Dioxide (CO2) 24      Anion Gap 9      Urea Nitrogen 6.1      Creatinine 0.99 (*)     GFR Estimate 83      Calcium 9.0      Chloride 100      Glucose 102 (*)     Alkaline Phosphatase 26 (*)     AST 20      ALT 16      Protein Total 7.6      Albumin 4.2      Bilirubin Total 0.3     ROUTINE UA WITH MICROSCOPIC REFLEX TO CULTURE - Abnormal    Color Urine Light Yellow      Appearance Urine Clear      Glucose Urine Negative      Bilirubin Urine Negative      Ketones Urine Negative      Specific Gravity Urine 1.013      Blood Urine Negative      pH Urine 6.0      Protein Albumin Urine Negative      Urobilinogen Urine Normal      Nitrite Urine Negative      Leukocyte Esterase Urine Negative      Bacteria Urine Few (*)     Mucus Urine Present (*)     RBC Urine 1      WBC Urine 1      Squamous Epithelials Urine <1     LIPASE - Normal    Lipase 22     HCG QUALITATIVE URINE - Normal    hCG Urine Qualitative Negative     CBC WITH PLATELETS AND DIFFERENTIAL    WBC Count 5.4      RBC Count 4.40      Hemoglobin 13.5      Hematocrit 40.3      MCV 92      MCH 30.7      MCHC 33.5      RDW 12.0      Platelet Count 202      % Neutrophils 51      % Lymphocytes 30      % Monocytes 17      % Eosinophils 0      % Basophils 1      % Immature Granulocytes 0      NRBCs per 100 WBC 0      Absolute Neutrophils 2.7      Absolute Lymphocytes 1.6      Absolute Monocytes 0.9      Absolute Eosinophils 0.0      Absolute Basophils 0.0      Absolute Immature Granulocytes 0.0      Absolute NRBCs 0.0         RADIOLOGY:  CT Abdomen Pelvis w  Contrast   Final Result   IMPRESSION:    Opacity along the posterior aspect of the right middle lobe is partially visualized and may represent pneumonia. Otherwise negative abdomen pelvis CT.          PROCEDURES:   Procedures       I, Roxy Smith, am serving as a scribe to document services personally performed by Pablo Herman MD, based on my observations and the provider's statements to me.  I, Pablo Herman MD, attest that Roxy Smith is acting in a scribe capacity, has observed my performance of the services and has documented them in accordance with my direction.    Pablo Herman MD  [facility] EMERGENCY DEPARTMENT  [address] [place] [id number]  [phone]        Pablo Herman MD  07/12/25 0647